# Patient Record
(demographics unavailable — no encounter records)

---

## 2024-12-02 NOTE — HISTORY OF PRESENT ILLNESS
[de-identified] : 60M presenting for his routine post op visit after robotic cholecystectomy for gallstone pancreatitis. Patient was readmitted for recurrent pancreatitis, found to have intra-ductal pancreatic head calculi and was discharged after his symptoms resolved. otherwise has been stable, no nausea or vomiting, tolerating diet, no fevers or chills and no pain.

## 2024-12-02 NOTE — ASSESSMENT
[FreeTextEntry1] : 60M s/p robotic cholecystectomy for gallstone pancreatitis and recurrent pancreatitis from intra-ductal pancreatic head calculi; Right inguinal hernia   - referral to [facility]  - plan for robotic inguinal hernia repair in 2-3 months from now - return in 8 weeks for f/u

## 2024-12-02 NOTE — DATA REVIEWED
[FreeTextEntry1] : [facility] Accession Number : [id number] Patient:     KAM MAGAÑA   Accession:                             [id number]  Collected Date/Time:                   11/15/2024 11:09 EST Received Date/Time:                    11/18/2024 11:09 EST  Surgical Pathology Report - Auth (Verified)  Specimen(s) Submitted 1  Gallbladder and stone  Final Diagnosis Gallbladder and stone; robot-assisted laparoscopic cholecystectomy: -   Chronic calculous cholecystitis.  Verified by: Shara Tucker MD (Electronic Signature) Reported on: 11/27/24 14:12 EST, [address] Phone: [phone]   Fax: [phone] _________________________________________________________________  Clinical Information Cholelithiasis, pancreatitis   Gross Description Received:  In formalin labeled  "gallbladder and stone" Integrity:  Intact, measuring 9.6 x 3.0 x 3.0 cm Cystic Duct:  Patent, marked by a white plastic clip and inked green Size: 0.6 cm in length and 0.3 cm in diameter Lymph Node:  Not identified Serosa:  Tan and smooth Hepatic Surface:  Cauterized and shaggy Bile:  Green, viscous, present in the lumen Calculi:  Multiple calculi, ranging from 0.2 cm to 0.7 cm  , dark green, ovoid, present in the lumen Mucosa:  Green and velvety Wall:  up to 0.2 cm  thick Submitted:  Representative sections in 1 cassette: 1A: Shaved cystic duct margin and full thickness sections to include neck, body and fundus  11/25/2024 02:10 PM  Disclaimer In addition to other data that may appear on the specimen containers, all labels have been inspected to confirm the presence of the patient's name and date of birth.   [place]ological [place] [address].   For slide(s) with built in immunohistochemical control(s), the control result(s) has/have been verified by the sign out pathologist. For slide(s) without built in controls positive control slides has/have been reviewed and approved by immunohistochemistry lab.  These immunohistochemical/ in-situ hybridization tests have been developed and their performance  characteristics determined by [facility], Department of Pathology, Division of Immunopathology, [address] [place] [id number]. It has not been cleared or approved by the U.S. Food and Drug Administration. The FDA has determined that such clearance or approval is not necessary. This test is used for clinical purposes. The laboratory is certified under the CLIA-88 as qualified to perform high complexity clinical testing.  For tests performed at [facility]: [address] [place] [address].  For tests performed at [facility] [address] [facility], [address] [place] [address]. For tests performed at [email or web address]: [facility] [address], [place] [address]. For tests performed at [employer] [address] [place] [address]. For tests performed at [facility] [address] [facility], [address] [place] [address]. For tests performed at [facility]: [address] [place] [address]. For tests performed at [facility]: [address], [address] Floor, [place] [address]. For tests performed at [facility]: [facility] [place] [address].   ACC: [id number] EXAM: MR MRCP ORDERED BY: MISTY ADAMS  PROCEDURE DATE: 11/20/2024    INTERPRETATION: CLINICAL INFORMATION: Possible CBD stone.  COMPARISON: CT dated 11/19/2024.  CONTRAST/COMPLICATIONS: IV Contrast: NONE Oral Contrast: NONE   PROCEDURE: MRI/MRCP was performed. Radial MRCP sequences were obtained. 3-D MR sequences could not be obtained and postcontrast imaging could not be performed due to patient's inability to cooperate.   FINDINGS: LOWER CHEST: Within normal limits.  LIVER: Within normal limits. BILE DUCTS: Normal caliber. CBD measures 6 mm. No evidence of a common duct stone. GALLBLADDER: Cholecystectomy. SPLEEN: Within normal limits. PANCREAS: Beaded appearance of the pancreatic duct involving the body and the tail of the pancreas compatible with chronic pancreatitis.  Multiple signal voids involving the head of the pancreas compatible with calcifications demonstrated on CT imaging. This includes a calculus within the pancreatic duct. Dilatation of the downstream pancreatic duct measuring up to 8mm. Trace peripancreatic inflammatory change. No peripancreatic collection. ADRENALS: Within normal limits. KIDNEYS/URETERS: Bilateral renal cysts including a hemorrhagic cyst involving the lower pole of the left kidney that measures approximately 1.3 cm.  VISUALIZED PORTIONS: BOWEL: Within normal limits. PERITONEUM: No ascites. VESSELS: Within normal limits. RETROPERITONEUM/LYMPH NODES: No lymphadenopathy. ABDOMINAL WALL: Within normal limits. BONES: Within normal limits.  IMPRESSION: No evidence of biliary obstruction or a common duct stone. Acute on chronic calcific pancreatitis similar to the prior study.   ACC: [id number] EXAM: CT ABDOMEN AND PELVIS IC ORDERED BY: JASEN JAMES  PROCEDURE DATE: 11/19/2024    INTERPRETATION: CLINICAL INFORMATION: Pancreatitis. Abdominal pain. Postoperative day 4 robotic-assisted laparoscopic cholecystectomy  COMPARISON: CT abdomen and pelvis 11/12/2024 and MR abdomen 10/4/2024  CONTRAST/COMPLICATIONS: IV Contrast: Omnipaque 350 90 cc administered 10 cc discarded Oral Contrast: NONE   PROCEDURE: CT of the Abdomen and Pelvis was performed. Sagittal and coronal reformats were performed.  FINDINGS: LOWER CHEST: Lower esophageal thickening with small hiatal hernia. Mild bibasilar subsegmental atelectasis.  LIVER: Periportal edema BILE DUCTS: Mild intrahepatic biliary dilation probably reservoir effect following cholecystectomy. GALLBLADDER: Cholecystectomy. Minimal unorganized fluid at the gallbladder fossa SPLEEN: Within normal limits. PANCREAS: Persistent mild diffuse peripancreatic inflammation. Mild diffuse main duct dilation measures up to 5 mm at the neck. Clustered intraductal calculi and parenchymal calcifications at the head and uncinate ADRENALS: Within normal limits. KIDNEYS/URETERS: Symmetric renal enhancement without hydronephrosis. Variably sized bilateral cysts Left lower pole heterogeneous 1 cm lesion corresponds with a hemorrhagic cyst on MRI  BLADDER: Within normal limits. REPRODUCTIVE ORGANS: Prostate is enlarged. Hypertrophy of the median lobe which partially protrudes into the bladder lumen  BOWEL: Decompressed stomach suggests wall thickening which may indicate gastritis however this appearance may also be accentuated by decompressed status. No bowel obstruction. Appendectomy PERITONEUM/RETROPERITONEUM: Minimal residual postoperative pneumoperitoneum. VESSELS: Atherosclerotic changes. LYMPH NODES: No lymphadenopathy. ABDOMINAL WALL: Recent ventral wall postsurgical changes without associated fluid collection. Tiny fat-containing umbilical hernia. Small fat and fluid containing right inguinal hernia BONES: Multilevel degenerative changes throughout the spine.  IMPRESSION: Expected recent postoperative changes following cholecystectomy. Minimal unorganized fluid at gallbladder fossa  Persistent acute on chronic pancreatitis. No peripancreatic fluid collection  Findings suggestive for esophagitis and gastritis.

## 2025-01-06 NOTE — DATA REVIEWED
[FreeTextEntry1] : ACC: [id number] EXAM: CT ABDOMEN AND PELVIS IC ORDERED BY: TRACIE CHENG  PROCEDURE DATE: 11/12/2024    INTERPRETATION: CLINICAL INFORMATION: Abdominal pain.  COMPARISON: 11/03/2024.  CONTRAST/COMPLICATIONS: IV Contrast: Omnipaque 350 90 cc administered 10 cc discarded Oral Contrast: NONE Complications: None reported at time of study completion  PROCEDURE: CT of the Abdomen and Pelvis was performed. Sagittal and coronal reformats were performed.  FINDINGS: LOWER CHEST: Mild atelectasis at the bilateral lung bases.  LIVER: Within normal limits. BILE DUCTS: Normal caliber. GALLBLADDER: Cholelithiasis. SPLEEN: Within normal limits. PANCREAS: Edema in the pancreatic body and tail and diffuse dilatation of the main pancreatic duct. Calcifications in the pancreatic head likely the sequela of chronic pancreatitis. No peripancreatic collection. ADRENALS: Within normal limits. KIDNEYS/URETERS: Bilateral renal cysts and subcentimeter hypodensities too small to characterize. No hydronephrosis.  BLADDER: Within normal limits. REPRODUCTIVE ORGANS: Prostate is enlarged.  BOWEL: No bowel obstruction. Appendix is absent. PERITONEUM/RETROPERITONEUM: No loculated fluid collections or free air. VESSELS: Minimal atherosclerotic calcifications. Splenic vein is patent. LYMPH NODES: No lymphadenopathy. ABDOMINAL WALL: Small fat-containing umbilical hernia. Bilateral right greater than left inguinal hernias contain fat and, on the right, fluid. BONES: Degenerative changes.  IMPRESSION: Acute on chronic pancreatitis. No peripancreatic collections.  --- End of Report ---      RADHA VALLECILLO MD; Attending Radiologist This document has been electronically signed. Nov 12 2024 6:48AM

## 2025-01-06 NOTE — HISTORY OF PRESENT ILLNESS
[de-identified] : 60M presenting for his routine post op visit after robotic cholecystectomy for gallstone pancreatitis. Patient was readmitted for recurrent pancreatitis, found to have intra-ductal pancreatic head calculi and was discharged after his symptoms resolved. otherwise has been stable, no nausea or vomiting, tolerating diet, no fevers or chills and no pain.   1/6/24 Patient returns for f/u, he was recently discharged from hospital for admission for recurrent pancreatitis and underwent an ERCP with sphincterotomy and removal of biliary sludge. Since discharge he has been pain free, no nausea or vomiting and having regular bowel function. He comes today for f/u and to discuss surgery for her bilateral inguinal hernias, they have been present for at least 2 years and are mostly symptomatic on the right side where he has a persistent bulge that sometimes reduces. has only minor discomfort on the left side. He has multiple CT scans with evidence of bilateral inguinal hernias right greater then left.

## 2025-01-06 NOTE — PHYSICAL EXAM
[de-identified] : well healing robotic incisions, no hernia or evidence of infection. right inguinal scrotal hernia, partial reducible, small left inguinal hernia

## 2025-01-06 NOTE — ASSESSMENT
[FreeTextEntry1] : 60M s/p robotic cholecystectomy for gallstone pancreatitis and recurrent pancreatitis from intra-ductal pancreatic head calculi; Bilateral inguinal hernia   - Given patients symptomatology, will book for robotic bilateral inguinal hernia repair with mesh - I discussed all operative and non-operative therapies for his inguinal hernias. I discussed the use of permanent mesh and all risks and benefits or robotic and inguinal hernia surgery including the possibility of chronic pain. Patient agreed and wants to proceed with surgery.

## 2025-02-22 NOTE — REVIEW OF SYSTEMS
[As Noted in HPI] : as noted in HPI [Negative] : Heme/Lymph Post-Care Instructions: WOUND CARE- BE SURE TO CLEAN THE WOUND AT LEAST TWICE A DAY\\nFirst, keep bandage on for 48 hours after surgery. After the 48 hours make sure your hands are clean, then clean the wound with soap and water. Once the wound is clean, gently PAT it dry and apply a layer of white Petroleum Jelly or plain Vaseline to the area, NO ANTIBIOTIC OINTMENT. A loose non-stick bandage is necessary only when leaving the house, when going to bed, to keep Vaseline on incision site, and to protect clothing. DO NOT expose the wound to bath water, direct pressure from the shower nozzle, lakes or pools unless instructed otherwise.\\nBLEEDING- DO NOT TAKE ADDITIONAL ASPIRIN OR DRINK ALCOHOL FOR 4-5 DAYS AFTER SURGERY.\\nFollowing surgery, bleeding is always possible. This is usually prevented by proper wound dressing. Avoid bending over. If you must do so, make sure to keep your head over your heart (squat). No heavy lifting or any physically demanding activity for 5-7 days to decrease your chances of bleeding or complications. If surgery has been performed on the face, one should not bend over unnecessarily and sleeping on an extra pillow to elevate the head could be wise for a night or two. \\n*If bleeding occurs hold firm pressure for 20 minutes straight and apply ice. If the bleeding does not subside please call our office during normal business hours or the company ON CALL NURSE number listed below.\\nPAIN- Post-operative pain in generally slight and you may have been given a mild medication for this. If not, and you need it please call our office.\\nINFECTION- Infection is seldom a problem but will be indicated by increase tenderness, swelling, pain, or discharge beginning 2-3 days after surgery. If this happens, we want to know.\\nSWELLING- Apply an ice bag, a gel pack, or crushed ice in a bag wrapped in a towel for 15-20 minutes about 4 times a day until swelling lessens or as desired.\\nMEDICATION- check with the doctor before taking pain medication, arthritis medication, or rheumatism medication following surgery. DO NOT DRINK ALCOHOLIC BEVERAGES FOR 5-7 DAYS AFTER SURGERY.\\nSCAR- There will be a scar and redness after surgery. This will decrease as healing progresses, but redness should be expected for as long as 6 months. Everyone heals differently and the final scar appearance depends on the individual’s ability to heal. In other words, some scars heal imperceptibly while other scars become thick, keloid and/or tender. Due to the unpredictability in wound healing, no guarantees can be implied or stated as to the final appearance of the scar.\\nIf you have any questions or concerns, please do not hesitate to contact the office and speak with a member of our clinical staff. In case of an emergency, page a dermatology nurse.\\Sylvia Cruz, DO [facility]\\n(Emergency Only) Derm Nurse Cell: [phone]\\n[place]: [phone]\\n[place]: [phone]\\n[place]: [phone]\\n[place]: [phone]\\n[place]: [phone] Post-Care Instructions: WOUND CARE- BE SURE TO CLEAN THE WOUND AT LEAST TWICE A DAY\\nFirst, keep bandage on for 48 hours after surgery. After the 48 hours make sure your hands are clean, then clean the wound with soap and water. Once the wound is clean, gently PAT it dry and apply a layer of white Petroleum Jelly or plain Vaseline to the area, NO ANTIBIOTIC OINTMENT. A loose non-stick bandage is necessary only when leaving the house, when going to bed, to keep Vaseline on incision site, and to protect clothing. DO NOT expose the wound to bath water, direct pressure from the shower nozzle, lakes or pools unless instructed otherwise.\\nBLEEDING- DO NOT TAKE ADDITIONAL ASPIRIN OR DRINK ALCOHOL FOR 4-5 DAYS AFTER SURGERY.\\nFollowing surgery, bleeding is always possible. This is usually prevented by proper wound dressing. Avoid bending over. If you must do so, make sure to keep your head over your heart (squat). No heavy lifting or any physically demanding activity for 5-7 days to decrease your chances of bleeding or complications. If surgery has been performed on the face, one should not bend over unnecessarily and sleeping on an extra pillow to elevate the head could be wise for a night or two. \\n*If bleeding occurs hold firm pressure for 20 minutes straight and apply ice. If the bleeding does not subside please call our office during normal business hours or the company ON CALL NURSE number listed below.\\nPAIN- Post-operative pain in generally slight and you may have been given a mild medication for this. If not, and you need it please call our office.\\nINFECTION- Infection is seldom a problem but will be indicated by increase tenderness, swelling, pain, or discharge beginning 2-3 days after surgery. If this happens, we want to know.\\nSWELLING- Apply an ice bag, a gel pack, or crushed ice in a bag wrapped in a towel for 15-20 minutes about 4 times a day until swelling lessens or as desired.\\nMEDICATION- check with the doctor before taking pain medication, arthritis medication, or rheumatism medication following surgery. DO NOT DRINK ALCOHOLIC BEVERAGES FOR 5-7 DAYS AFTER SURGERY.\\nSCAR- There will be a scar and redness after surgery. This will decrease as healing progresses, but redness should be expected for as long as 6 months. Everyone heals differently and the final scar appearance depends on the individual’s ability to heal. In other words, some scars heal imperceptibly while other scars become thick, keloid and/or tender. Due to the unpredictability in wound healing, no guarantees can be implied or stated as to the final appearance of the scar.\\nIf you have any questions or concerns, please do not hesitate to contact the office and speak with a member of our clinical staff. In case of an emergency, page a dermatology nurse.\\Sylvia Cruz, DO [facility]\\n(Emergency Only) Derm Nurse Cell: [phone]\\n[place]: [phone]\\n[place]: [phone]\\n[place]: [phone]\\n[place]: [phone]\\n[place]: [phone]

## 2025-02-22 NOTE — PHYSICAL EXAM
[No Rash or Lesion] : No rash or lesion [Purpura] : no purpura  [Petechiae] : no petechiae [Skin Ulcer] : no ulcer [Skin Induration] : no induration [Alert] : alert [Oriented to Person] : oriented to person [Oriented to Place] : oriented to place [Oriented to Time] : oriented to time [Calm] : calm [de-identified] : In no acute distress [de-identified] : well healing robotic incisions, no incisional hernia or evidence of infection. right inguinal region without recurrence of hernia at this time, soft

## 2025-02-22 NOTE — CONSULT LETTER
[Dear  ___] : Dear  [unfilled], [Consult Letter:] : I had the pleasure of evaluating your patient, [unfilled]. [Please see my note below.] : Please see my note below. [Consult Closing:] : Thank you very much for allowing me to participate in the care of this patient.  If you have any questions, please do not hesitate to contact me. [Sincerely,] : Sincerely, [FreeTextEntry3] : Jarad Rashid MD General & MIS/Robotic Surgery [facility] at [address] [place] [address] Tel [phone] Fax [phone] Cell [phone]

## 2025-02-22 NOTE — ASSESSMENT
[FreeTextEntry1] : 60M s/p robotic cholecystectomy for gallstone pancreatitis and recurrent pancreatitis from intra-ductal pancreatic head calculi; Bilateral inguinal hernia s/p Robotic right inguinal hernia repair with mesh  - no acute surgical issues at this time - expected incisional pain on right sided trocar incision given need to upsize and close fascia in order to remove large inguinal lipoma specimen - will send flexuril and gabapentin for pain control  - return to office in 6-8 weeks for post op visit - continue no lifting restrictions for another 4 weeks

## 2025-02-22 NOTE — HISTORY OF PRESENT ILLNESS
[de-identified] : 60M presenting for his routine post op visit after robotic cholecystectomy for gallstone pancreatitis. Patient was readmitted for recurrent pancreatitis, found to have intra-ductal pancreatic head calculi and was discharged after his symptoms resolved. otherwise has been stable, no nausea or vomiting, tolerating diet, no fevers or chills and no pain.   1/6/24 Patient returns for f/u, he was recently discharged from hospital for admission for recurrent pancreatitis and underwent an ERCP with sphincterotomy and removal of biliary sludge. Since discharge he has been pain free, no nausea or vomiting and having regular bowel function. He comes today for f/u and to discuss surgery for her bilateral inguinal hernias, they have been present for at least 2 years and are mostly symptomatic on the right side where he has a persistent bulge that sometimes reduces. has only minor discomfort on the left side. He has multiple CT scans with evidence of bilateral inguinal hernias right greater then left.   2/13/25 Patient returns post op after Robotic right inguinal hernia repair with mesh. Patient is doing well, pain is well controlled, no fever or chills, no nausea or vomiting, having regular bowel function

## 2025-02-22 NOTE — DATA REVIEWED
[FreeTextEntry1] : [facility] Accession Number : [id number] Patient:     KAM MAGAÑA   Accession:                             [id number]  Collected Date/Time:                   1/29/2025 17:10 EST Received Date/Time:                    1/30/2025 11:36 EST  Surgical Pathology Report - Auth (Verified)  Specimen(s) Submitted Lipoma of cord  Final Diagnosis Inguinal hernia; surgical repair: -   Focally inflamed fibrous tissue and vascularized adipose tissue  Verified by: Cierra Garrett M.D. (Electronic Signature) Reported on: 02/03/25 14:10 EST, [address] Phone: [phone]   Fax: [phone] _________________________________________________________________  Clinical Information Bilateral inguinal hernia   Gross Description Received:  In formalin labeled  "cord lipoma" , consisting of portion of adipose tissue Number:  One, yellow-tan soft adipose tissue adherent with pink- purple soft membranous tissue Size:  7.3 x 3.8 x 3.2 cm, Attached Skin:  Not present Cut surfaces:  Yellow, soft homogenous Submitted:  Representative sections in one cassette: 1A  Heidy Anuradha 01/31/2025 11:35 AM  Disclaimer In addition to other data that may appear on the specimen containers, all labels have been inspected to confirm the presence of the patient's name and date of birth.   [place]ological [place] [address].  For slide(s) with built in immunohistochemical control(s), the control result(s) has/have been verified by the sign out pathologist. For slide(s) without built in controls positive control slides has/have been reviewed and approved by immunohistochemistry lab.  These immunohistochemical/ in-situ hybridization tests have been  developed and their performance  characteristics determined by [facility], Department of Pathology, Division of Immunopathology, [address] [place] [address]. It has not been cleared or approved by the U.S. Food and Drug Administration. The FDA has determined that such clearance or approval is not necessary. This test is used for clinical purposes. The laboratory is certified under the CLIA-88 as qualified to perform high complexity clinical testing.  For tests performed at [facility]: [address] [place] [address].  For tests performed at [facility] [address], [address] [place] [address]. For tests performed at [facility]: [facility] [address], [place] [address]. For tests performed at [employer] [facility] [address] [place] [address]. For tests performed at [employer] [address], [address] [place] [address]. For tests performed at [facility]: [address] [place] [address]. For tests performed at [facility]: [address], [place] [address]. For tests performed at [facility]: [facility] [place] [address].

## 2025-03-13 NOTE — PHYSICAL EXAM
[General Appearance - Alert] : alert [General Appearance - In No Acute Distress] : in no acute distress [Person] : oriented to person [Place] : oriented to place [Time] : oriented to time [Concentration Intact] : normal concentrating ability [Naming Objects] : no difficulty naming common objects [Fluency] : fluency intact [Comprehension] : comprehension intact [Vocabulary] : adequate range of vocabulary [Cranial Nerves Optic (II)] : visual acuity intact bilaterally,  visual fields full to confrontation, pupils equal round and reactive to light [Cranial Nerves Oculomotor (III)] : extraocular motion intact [Cranial Nerves Trigeminal (V)] : facial sensation intact symmetrically [Cranial Nerves Vestibulocochlear (VIII)] : hearing was intact bilaterally [Cranial Nerves Glossopharyngeal (IX)] : tongue and palate midline [Cranial Nerves Accessory (XI - Cranial And Spinal)] : head turning and shoulder shrug symmetric [Cranial Nerves Hypoglossal (XII)] : there was no tongue deviation with protrusion [Motor Tone] : muscle tone was normal in all four extremities [Motor Strength] : muscle strength was normal in all four extremities [Involuntary Movements] : no involuntary movements were seen [Sensation Tactile Decrease] : light touch was intact [Abnormal Walk] : normal gait [Balance] : balance was intact [Coordination - Dysmetria Impaired Finger-to-Nose Bilateral] : not present [Coordination - Dysmetria Impaired Heel-to-Shin Bilateral] : not present [FreeTextEntry5] : left NLF on neuro exam

## 2025-03-13 NOTE — HISTORY OF PRESENT ILLNESS
[FreeTextEntry1] : Patient is here for evaluation of dizziness described as lightheadedness which occurs when he changes position from sitting to standing.  There is no episodes of loss of consciousness.    No difficulty with language.  No vision loss or double vision.  No vertigo or new hearing loss.  No difficulty with speech or swallowing.  No focal weakness.  No focal sensory changes.  No difficulty with balance.  No difficulty with ambulation.  The patient says that he drinks about 3 L of water per day.  He denies drinking any caffeinated drinks. Denies chest pain or palpitations.  Denies known arrhythmias.

## 2025-03-13 NOTE — CONSULT LETTER
[Dear  ___] : Dear  [unfilled], [Consult Letter:] : I had the pleasure of evaluating your patient, [unfilled]. [Please see my note below.] : Please see my note below. [Consult Closing:] : Thank you very much for allowing me to participate in the care of this patient.  If you have any questions, please do not hesitate to contact me. [Sincerely,] : Sincerely, [FreeTextEntry3] : Yazmin Grey MD, MPH

## 2025-03-13 NOTE — ASSESSMENT
[FreeTextEntry1] : dizziness patient with possible statics likely secondary to orthostatic hypotension, however given his dizziness will obtain imaging of the brain to r/o cva and arterial insufficiency patient has left NLF on neuro exam will get mri prateek and mra head and neck as well as basic labs to eval and refer to cards Advised patient to increase fluid intake to about 3.7 L/day.  I spent the time noted on the day of this patient encounter preparing for, providing and documenting the above E/M service and counseling and educate patient on differential, workup, disease course, and treatment/management. Education was provided to the patient during this encounter. All questions and concerns were answered and addressed in detail. The patient verbalized understanding and agreed to plan. Patient was advised to continue to monitor for neurologic symptoms and to notify my office or go to the nearest emergency room if there are any changes. Any orders/referrals and communications were provided as well.   Side effects of the above medications were discussed in detail including but not limited to applicable black box warning and teratogenicity as appropriate.  For patients with seizures, I discussed risk of SUDEP with patient and advised that SUDEP risk can be minimized with good seizure control through medication compliance and other measures. Patient was advised to bring previous records to my office, including CD of imaging, when applicable.

## 2025-03-14 NOTE — ASSESSMENT
[FreeTextEntry1] : 60 M h/o depression, HL, HTN, pancreatitis c/b pancreatic stones, here in hospital f/u.

## 2025-06-23 NOTE — ASSESSMENT
[FreeTextEntry1] : dizziness patient with possible statics likely secondary to orthostatic hypotension, symptoms resolved with increasing point intake. patient has left NLF on neuro exam Brain MRI: No acute infarct. Brain MRA: No large vessel occlusion or aneurysm.  basic labs noted  referred to cards Advised patient to increase fluid intake to about 3.7 L/day.  Leg cramping of unclear etiology, may be related to  I spent the time noted on the day of this patient encounter preparing for, providing and documenting the above E/M service and counseling and educate patient on differential, workup, disease course, and treatment/management. Education was provided to the patient during this encounter. All questions and concerns were answered and addressed in detail. The patient verbalized understanding and agreed to plan. Patient was advised to continue to monitor for neurologic symptoms and to notify my office or go to the nearest emergency room if there are any changes. Any orders/referrals and communications were provided as well.  Side effects of the above medications were discussed in detail including but not limited to applicable black box warning and teratogenicity as appropriate. For patients with seizures, I discussed risk of SUDEP with patient and advised that SUDEP risk can be minimized with good seizure control through medication compliance and other measures. Patient was advised to bring previous records to my office, including CD of imaging, when applicable.

## 2025-06-23 NOTE — HISTORY OF PRESENT ILLNESS
[FreeTextEntry1] : Patient is here for evaluation of dizziness described as lightheadedness which occurs when he changes position from sitting to standing. There is no episodes of loss of consciousness.  No difficulty with language. No vision loss or double vision. No vertigo or new hearing loss. No difficulty with speech or swallowing. No focal weakness. No focal sensory changes. No difficulty with balance. No difficulty with ambulation.  The patient says that he drinks about 3 L of water per day. He denies drinking any caffeinated drinks. Denies chest pain or palpitations. Denies known arrhythmias.  Patient advised to increase fluid intake and the dizziness has resolved since last visit.  He notes that at times he has episodes when he feels both legs are cramping.  This is worse with specific movements.  No weakness in the legs.  He does have occasional back pain.